# Patient Record
Sex: MALE | Race: OTHER | HISPANIC OR LATINO | Employment: FULL TIME | URBAN - METROPOLITAN AREA
[De-identification: names, ages, dates, MRNs, and addresses within clinical notes are randomized per-mention and may not be internally consistent; named-entity substitution may affect disease eponyms.]

---

## 2018-03-27 ENCOUNTER — APPOINTMENT (OUTPATIENT)
Dept: RADIOLOGY | Facility: MEDICAL CENTER | Age: 38
End: 2018-03-27
Attending: EMERGENCY MEDICINE
Payer: MEDICAID

## 2018-03-27 ENCOUNTER — HOSPITAL ENCOUNTER (EMERGENCY)
Facility: MEDICAL CENTER | Age: 38
End: 2018-03-27
Attending: EMERGENCY MEDICINE
Payer: MEDICAID

## 2018-03-27 VITALS
OXYGEN SATURATION: 98 % | RESPIRATION RATE: 18 BRPM | WEIGHT: 290 LBS | SYSTOLIC BLOOD PRESSURE: 135 MMHG | BODY MASS INDEX: 42.95 KG/M2 | HEART RATE: 117 BPM | HEIGHT: 69 IN | TEMPERATURE: 98.5 F | DIASTOLIC BLOOD PRESSURE: 86 MMHG

## 2018-03-27 DIAGNOSIS — S01.81XA FACIAL LACERATION, INITIAL ENCOUNTER: ICD-10-CM

## 2018-03-27 DIAGNOSIS — R40.4 TRANSIENT ALTERATION OF AWARENESS: ICD-10-CM

## 2018-03-27 DIAGNOSIS — S41.112A LACERATION OF LEFT UPPER EXTREMITY, INITIAL ENCOUNTER: ICD-10-CM

## 2018-03-27 DIAGNOSIS — F10.920 ALCOHOLIC INTOXICATION WITHOUT COMPLICATION (HCC): ICD-10-CM

## 2018-03-27 PROBLEM — S51.812A FOREARM LACERATION, LEFT, INITIAL ENCOUNTER: Status: ACTIVE | Noted: 2018-03-27

## 2018-03-27 PROBLEM — S01.01XA SCALP LACERATION, INITIAL ENCOUNTER: Status: ACTIVE | Noted: 2018-03-27

## 2018-03-27 PROBLEM — Z78.9 NO CONTRAINDICATION TO DEEP VEIN THROMBOSIS (DVT) PROPHYLAXIS: Status: ACTIVE | Noted: 2018-03-27
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
## 2018-03-27 LAB
ABO GROUP BLD: NORMAL
ABO GROUP BLD: NORMAL
ALBUMIN SERPL BCP-MCNC: 3.9 G/DL (ref 3.2–4.9)
ALBUMIN/GLOB SERPL: 1.1 G/DL
ALP SERPL-CCNC: 71 U/L (ref 30–99)
ALT SERPL-CCNC: 41 U/L (ref 2–50)
ANION GAP SERPL CALC-SCNC: 13 MMOL/L (ref 0–11.9)
APTT PPP: 23.9 SEC (ref 24.7–36)
AST SERPL-CCNC: 33 U/L (ref 12–45)
BILIRUB SERPL-MCNC: 0.5 MG/DL (ref 0.1–1.5)
BLD GP AB SCN SERPL QL: NORMAL
BUN SERPL-MCNC: 12 MG/DL (ref 8–22)
CALCIUM SERPL-MCNC: 8.8 MG/DL (ref 8.5–10.5)
CFT BLD TEG: 4.8 MIN (ref 5–10)
CHLORIDE SERPL-SCNC: 101 MMOL/L (ref 96–112)
CLOT ANGLE BLD TEG: 63.9 DEGREES (ref 53–72)
CLOT LYSIS 30M P MA LENFR BLD TEG: 0 % (ref 0–8)
CO2 SERPL-SCNC: 22 MMOL/L (ref 20–33)
CREAT SERPL-MCNC: 1.1 MG/DL (ref 0.5–1.4)
CT.EXTRINSIC BLD ROTEM: 2 MIN (ref 1–3)
ERYTHROCYTE [DISTWIDTH] IN BLOOD BY AUTOMATED COUNT: 44 FL (ref 35.9–50)
ETHANOL BLD-MCNC: 0.33 G/DL
GLOBULIN SER CALC-MCNC: 3.6 G/DL (ref 1.9–3.5)
GLUCOSE SERPL-MCNC: 145 MG/DL (ref 65–99)
HCT VFR BLD AUTO: 46.9 % (ref 42–52)
HGB BLD-MCNC: 16.1 G/DL (ref 14–18)
INR PPP: 0.99 (ref 0.87–1.13)
MCF BLD TEG: 58.5 MM (ref 50–70)
MCH RBC QN AUTO: 32.1 PG (ref 27–33)
MCHC RBC AUTO-ENTMCNC: 34.3 G/DL (ref 33.7–35.3)
MCV RBC AUTO: 93.4 FL (ref 81.4–97.8)
PA AA BLD-ACNC: 0 %
PA ADP BLD-ACNC: 75.8 %
PLATELET # BLD AUTO: 272 K/UL (ref 164–446)
PMV BLD AUTO: 9.5 FL (ref 9–12.9)
POTASSIUM SERPL-SCNC: 3.3 MMOL/L (ref 3.6–5.5)
PROT SERPL-MCNC: 7.5 G/DL (ref 6–8.2)
PROTHROMBIN TIME: 12.8 SEC (ref 12–14.6)
RBC # BLD AUTO: 5.02 M/UL (ref 4.7–6.1)
RH BLD: NORMAL
RH BLD: NORMAL
SODIUM SERPL-SCNC: 136 MMOL/L (ref 135–145)
TEG ALGORITHM TGALG: ABNORMAL
WBC # BLD AUTO: 9 K/UL (ref 4.8–10.8)

## 2018-03-27 PROCEDURE — 70491 CT SOFT TISSUE NECK W/DYE: CPT

## 2018-03-27 PROCEDURE — 85730 THROMBOPLASTIN TIME PARTIAL: CPT

## 2018-03-27 PROCEDURE — 305949 HCHG RED TRAUMA ACT PRE-NOTIFY NO CC

## 2018-03-27 PROCEDURE — 303747 HCHG EXTRA SUTURE

## 2018-03-27 PROCEDURE — 305308 HCHG STAPLER,SKIN,DISP.

## 2018-03-27 PROCEDURE — 85576 BLOOD PLATELET AGGREGATION: CPT

## 2018-03-27 PROCEDURE — 304999 HCHG REPAIR-SIMPLE/INTERMED LEVEL 1

## 2018-03-27 PROCEDURE — 700111 HCHG RX REV CODE 636 W/ 250 OVERRIDE (IP)

## 2018-03-27 PROCEDURE — 700111 HCHG RX REV CODE 636 W/ 250 OVERRIDE (IP): Performed by: EMERGENCY MEDICINE

## 2018-03-27 PROCEDURE — 86901 BLOOD TYPING SEROLOGIC RH(D): CPT

## 2018-03-27 PROCEDURE — 96365 THER/PROPH/DIAG IV INF INIT: CPT | Mod: XU

## 2018-03-27 PROCEDURE — 700105 HCHG RX REV CODE 258: Performed by: EMERGENCY MEDICINE

## 2018-03-27 PROCEDURE — 90471 IMMUNIZATION ADMIN: CPT

## 2018-03-27 PROCEDURE — 304217 HCHG IRRIGATION SYSTEM

## 2018-03-27 PROCEDURE — 99285 EMERGENCY DEPT VISIT HI MDM: CPT

## 2018-03-27 PROCEDURE — 96368 THER/DIAG CONCURRENT INF: CPT

## 2018-03-27 PROCEDURE — 700105 HCHG RX REV CODE 258: Performed by: SURGERY

## 2018-03-27 PROCEDURE — 85384 FIBRINOGEN ACTIVITY: CPT

## 2018-03-27 PROCEDURE — 304561 HCHG STAT O2

## 2018-03-27 PROCEDURE — 303484 HCHG DRESSING LARGE

## 2018-03-27 PROCEDURE — 700101 HCHG RX REV CODE 250

## 2018-03-27 PROCEDURE — 90715 TDAP VACCINE 7 YRS/> IM: CPT | Performed by: EMERGENCY MEDICINE

## 2018-03-27 PROCEDURE — 700117 HCHG RX CONTRAST REV CODE 255: Performed by: EMERGENCY MEDICINE

## 2018-03-27 PROCEDURE — 86850 RBC ANTIBODY SCREEN: CPT

## 2018-03-27 PROCEDURE — 700111 HCHG RX REV CODE 636 W/ 250 OVERRIDE (IP): Performed by: SURGERY

## 2018-03-27 PROCEDURE — 96361 HYDRATE IV INFUSION ADD-ON: CPT | Mod: XU

## 2018-03-27 PROCEDURE — 85027 COMPLETE CBC AUTOMATED: CPT

## 2018-03-27 PROCEDURE — 85610 PROTHROMBIN TIME: CPT

## 2018-03-27 PROCEDURE — 85347 COAGULATION TIME ACTIVATED: CPT

## 2018-03-27 PROCEDURE — 80053 COMPREHEN METABOLIC PANEL: CPT

## 2018-03-27 PROCEDURE — 71260 CT THORAX DX C+: CPT

## 2018-03-27 PROCEDURE — 80307 DRUG TEST PRSMV CHEM ANLYZR: CPT

## 2018-03-27 PROCEDURE — 70450 CT HEAD/BRAIN W/O DYE: CPT

## 2018-03-27 PROCEDURE — 71045 X-RAY EXAM CHEST 1 VIEW: CPT

## 2018-03-27 PROCEDURE — 86900 BLOOD TYPING SEROLOGIC ABO: CPT

## 2018-03-27 RX ORDER — ONDANSETRON 2 MG/ML
INJECTION INTRAMUSCULAR; INTRAVENOUS
Status: COMPLETED
Start: 2018-03-27 | End: 2018-03-27

## 2018-03-27 RX ORDER — SODIUM CHLORIDE 9 MG/ML
INJECTION, SOLUTION INTRAVENOUS ONCE
Status: COMPLETED | OUTPATIENT
Start: 2018-03-27 | End: 2018-03-27

## 2018-03-27 RX ORDER — MIDAZOLAM HYDROCHLORIDE 1 MG/ML
INJECTION INTRAMUSCULAR; INTRAVENOUS
Status: COMPLETED
Start: 2018-03-27 | End: 2018-03-27

## 2018-03-27 RX ORDER — LIDOCAINE HYDROCHLORIDE AND EPINEPHRINE BITARTRATE 20; .01 MG/ML; MG/ML
INJECTION, SOLUTION SUBCUTANEOUS
Status: COMPLETED
Start: 2018-03-27 | End: 2018-03-27

## 2018-03-27 RX ORDER — LIDOCAINE HYDROCHLORIDE AND EPINEPHRINE BITARTRATE 20; .01 MG/ML; MG/ML
40 INJECTION, SOLUTION SUBCUTANEOUS ONCE
Status: COMPLETED | OUTPATIENT
Start: 2018-03-27 | End: 2018-03-27

## 2018-03-27 RX ORDER — SODIUM CHLORIDE 9 MG/ML
1000 INJECTION, SOLUTION INTRAVENOUS ONCE
Status: COMPLETED | OUTPATIENT
Start: 2018-03-27 | End: 2018-03-27

## 2018-03-27 RX ORDER — LORAZEPAM 2 MG/ML
INJECTION INTRAMUSCULAR
Status: COMPLETED
Start: 2018-03-27 | End: 2018-03-27

## 2018-03-27 RX ADMIN — PROPOFOL 20 MG: 10 INJECTION, EMULSION INTRAVENOUS at 03:37

## 2018-03-27 RX ADMIN — CLOSTRIDIUM TETANI TOXOID ANTIGEN (FORMALDEHYDE INACTIVATED), CORYNEBACTERIUM DIPHTHERIAE TOXOID ANTIGEN (FORMALDEHYDE INACTIVATED), BORDETELLA PERTUSSIS TOXOID ANTIGEN (GLUTARALDEHYDE INACTIVATED), BORDETELLA PERTUSSIS FILAMENTOUS HEMAGGLUTININ ANTIGEN (FORMALDEHYDE INACTIVATED), BORDETELLA PERTUSSIS PERTACTIN ANTIGEN, AND BORDETELLA PERTUSSIS FIMBRIAE 2/3 ANTIGEN 0.5 ML: 5; 2; 2.5; 5; 3; 5 INJECTION, SUSPENSION INTRAMUSCULAR at 03:26

## 2018-03-27 RX ADMIN — LIDOCAINE HYDROCHLORIDE AND EPINEPHRINE BITARTRATE 40 ML: 20; .01 INJECTION, SOLUTION SUBCUTANEOUS at 03:30

## 2018-03-27 RX ADMIN — CEFAZOLIN SODIUM 2 G: 1 INJECTION, SOLUTION INTRAVENOUS at 03:28

## 2018-03-27 RX ADMIN — MIDAZOLAM HYDROCHLORIDE 2 MG: 1 INJECTION, SOLUTION INTRAMUSCULAR; INTRAVENOUS at 05:46

## 2018-03-27 RX ADMIN — PROPOFOL 20 MG: 10 INJECTION, EMULSION INTRAVENOUS at 03:49

## 2018-03-27 RX ADMIN — LORAZEPAM 2 MG: 2 INJECTION INTRAMUSCULAR; INTRAVENOUS at 05:45

## 2018-03-27 RX ADMIN — LIDOCAINE HYDROCHLORIDE AND EPINEPHRINE 40 ML: 20; 10 INJECTION, SOLUTION INFILTRATION; PERINEURAL at 03:30

## 2018-03-27 RX ADMIN — PROPOFOL 20 MG: 10 INJECTION, EMULSION INTRAVENOUS at 03:31

## 2018-03-27 RX ADMIN — PROPOFOL 20 MG: 10 INJECTION, EMULSION INTRAVENOUS at 03:38

## 2018-03-27 RX ADMIN — PROPOFOL 50 MG: 10 INJECTION, EMULSION INTRAVENOUS at 03:41

## 2018-03-27 RX ADMIN — SODIUM CHLORIDE 1000 ML: 9 INJECTION, SOLUTION INTRAVENOUS at 05:47

## 2018-03-27 RX ADMIN — IOHEXOL 100 ML: 350 INJECTION, SOLUTION INTRAVENOUS at 05:49

## 2018-03-27 RX ADMIN — MIDAZOLAM 2 MG: 1 INJECTION INTRAMUSCULAR; INTRAVENOUS at 03:55

## 2018-03-27 RX ADMIN — ONDANSETRON HYDROCHLORIDE 4 MG: 2 INJECTION, SOLUTION INTRAMUSCULAR; INTRAVENOUS at 05:45

## 2018-03-27 RX ADMIN — SODIUM CHLORIDE: 9 INJECTION, SOLUTION INTRAVENOUS at 03:20

## 2018-03-27 RX ADMIN — PROPOFOL 30 MG: 10 INJECTION, EMULSION INTRAVENOUS at 03:28

## 2018-03-27 ASSESSMENT — PAIN SCALES - GENERAL: PAINLEVEL_OUTOF10: 0

## 2018-03-27 NOTE — ED NOTES
Dried blood cleaned off of face, head, and chest. ERP notified of open laceration to posterior head. ERP to bedside for wound closure.
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
## 2018-03-27 NOTE — ED NOTES
A&O x 4. Ambulatory with steady gait and able to dress self. Instructed on wound care and to return to ED to have sutures and staples removed. Verbalizes an understanding of discharge instructions. All questions addressed. Provided with clothing. HR elevated, ERP aware, otherwise VSS.

## 2018-03-27 NOTE — ED NOTES
Pt uncooperative and attempting to climb out of bed, pt educated on importance of calling for help. RN assisted pt to restroom, upon return pt refusing to wear 02 or monitors. Pt continues to throw off nasal cannula, sp02 98% RA while pt is awake. Pt to CT.
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
## 2018-03-27 NOTE — DISCHARGE INSTRUCTIONS
Please follow-up with Dr. Cohen in 7-10 days. Call tomorrow to schedule that appointment. Return to the emergency department if you develop any new or worsening symptoms including worsening pain, headaches, fevers, nausea, vomiting or any new or worsening symptoms. You may take Tylenol or ibuprofen as needed for pain. Please follow-up with your primary care physician for alcohol cessation resources.      Laceration Care, Adult  A laceration is a cut that goes through all of the layers of the skin and into the tissue that is right under the skin. Some lacerations heal on their own. Others need to be closed with stitches (sutures), staples, skin adhesive strips, or skin glue. Proper laceration care minimizes the risk of infection and helps the laceration to heal better.  HOW TO CARE FOR YOUR LACERATION  If sutures or staples were used:  · Keep the wound clean and dry.  · If you were given a bandage (dressing), you should change it at least one time per day or as told by your health care provider. You should also change it if it becomes wet or dirty.  · Keep the wound completely dry for the first 24 hours or as told by your health care provider. After that time, you may shower or bathe. However, make sure that the wound is not soaked in water until after the sutures or staples have been removed.  · Clean the wound one time each day or as told by your health care provider:  ¨ Wash the wound with soap and water.  ¨ Rinse the wound with water to remove all soap.  ¨ Pat the wound dry with a clean towel. Do not rub the wound.  · After cleaning the wound, apply a thin layer of antibiotic ointment as told by your health care provider. This will help to prevent infection and keep the dressing from sticking to the wound.  · Have the sutures or staples removed as told by your health care provider.  If skin adhesive strips were used:  · Keep the wound clean and dry.  · If you were given a bandage (dressing), you should change it  at least one time per day or as told by your health care provider. You should also change it if it becomes dirty or wet.  · Do not get the skin adhesive strips wet. You may shower or bathe, but be careful to keep the wound dry.  · If the wound gets wet, pat it dry with a clean towel. Do not rub the wound.  · Skin adhesive strips fall off on their own. You may trim the strips as the wound heals. Do not remove skin adhesive strips that are still stuck to the wound. They will fall off in time.  If skin glue was used:  · Try to keep the wound dry, but you may briefly wet it in the shower or bath. Do not soak the wound in water, such as by swimming.  · After you have showered or bathed, gently pat the wound dry with a clean towel. Do not rub the wound.  · Do not do any activities that will make you sweat heavily until the skin glue has fallen off on its own.  · Do not apply liquid, cream, or ointment medicine to the wound while the skin glue is in place. Using those may loosen the film before the wound has healed.  · If you were given a bandage (dressing), you should change it at least one time per day or as told by your health care provider. You should also change it if it becomes dirty or wet.  · If a dressing is placed over the wound, be careful not to apply tape directly over the skin glue. Doing that may cause the glue to be pulled off before the wound has healed.  · Do not pick at the glue. The skin glue usually remains in place for 5-10 days, then it falls off of the skin.  General Instructions  · Take over-the-counter and prescription medicines only as told by your health care provider.  · If you were prescribed an antibiotic medicine or ointment, take or apply it as told by your doctor. Do not stop using it even if your condition improves.  · To help prevent scarring, make sure to cover your wound with sunscreen whenever you are outside after stitches are removed, after adhesive strips are removed, or when glue  remains in place and the wound is healed. Make sure to wear a sunscreen of at least 30 SPF.  · Do not scratch or pick at the wound.  · Keep all follow-up visits as told by your health care provider. This is important.  · Check your wound every day for signs of infection. Watch for:  ¨ Redness, swelling, or pain.  ¨ Fluid, blood, or pus.  · Raise (elevate) the injured area above the level of your heart while you are sitting or lying down, if possible.  SEEK MEDICAL CARE IF:  · You received a tetanus shot and you have swelling, severe pain, redness, or bleeding at the injection site.  · You have a fever.  · A wound that was closed breaks open.  · You notice a bad smell coming from your wound or your dressing.  · You notice something coming out of the wound, such as wood or glass.  · Your pain is not controlled with medicine.  · You have increased redness, swelling, or pain at the site of your wound.  · You have fluid, blood, or pus coming from your wound.  · You notice a change in the color of your skin near your wound.  · You need to change the dressing frequently due to fluid, blood, or pus draining from the wound.  · You develop a new rash.  · You develop numbness around the wound.  SEEK IMMEDIATE MEDICAL CARE IF:  · You develop severe swelling around the wound.  · Your pain suddenly increases and is severe.  · You develop painful lumps near the wound or on skin that is anywhere on your body.  · You have a red streak going away from your wound.  · The wound is on your hand or foot and you cannot properly move a finger or toe.  · The wound is on your hand or foot and you notice that your fingers or toes look pale or bluish.     This information is not intended to replace advice given to you by your health care provider. Make sure you discuss any questions you have with your health care provider.     Document Released: 12/18/2006 Document Revised: 05/03/2016 Document Reviewed: 12/14/2015  [employer] Interactive Patient  Education ©2016 [employer]

## 2018-03-27 NOTE — ED NOTES
Pt brought to BL 15 from CT scan. Bedside report given to RN. Pt placed on the monitor, all alarms audible.
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
## 2024-07-05 ENCOUNTER — OFFICE VISIT (OUTPATIENT)
Dept: URGENT CARE | Facility: PHYSICIAN GROUP | Age: 44
End: 2024-07-05
Payer: COMMERCIAL

## 2024-07-05 VITALS
DIASTOLIC BLOOD PRESSURE: 100 MMHG | WEIGHT: 267.2 LBS | TEMPERATURE: 97.3 F | BODY MASS INDEX: 39.46 KG/M2 | RESPIRATION RATE: 16 BRPM | SYSTOLIC BLOOD PRESSURE: 130 MMHG | HEART RATE: 82 BPM | OXYGEN SATURATION: 98 %

## 2024-07-05 DIAGNOSIS — K29.20 ACUTE ALCOHOLIC GASTRITIS WITHOUT HEMORRHAGE: ICD-10-CM

## 2024-07-05 DIAGNOSIS — I10 PRIMARY HYPERTENSION: ICD-10-CM

## 2024-07-05 PROCEDURE — 99213 OFFICE O/P EST LOW 20 MIN: CPT | Mod: 25 | Performed by: NURSE PRACTITIONER

## 2024-07-05 PROCEDURE — 3080F DIAST BP >= 90 MM HG: CPT | Performed by: NURSE PRACTITIONER

## 2024-07-05 PROCEDURE — 3075F SYST BP GE 130 - 139MM HG: CPT | Performed by: NURSE PRACTITIONER

## 2024-07-05 RX ORDER — ALBUTEROL SULFATE 90 UG/1
AEROSOL, METERED RESPIRATORY (INHALATION)
COMMUNITY
Start: 2024-06-17

## 2024-07-05 RX ORDER — LISINOPRIL 20 MG/1
TABLET ORAL
COMMUNITY
Start: 2024-05-23